# Patient Record
Sex: FEMALE | Race: WHITE | NOT HISPANIC OR LATINO | URBAN - METROPOLITAN AREA
[De-identification: names, ages, dates, MRNs, and addresses within clinical notes are randomized per-mention and may not be internally consistent; named-entity substitution may affect disease eponyms.]

---

## 2024-01-26 ENCOUNTER — OFFICE VISIT (OUTPATIENT)
Dept: URGENT CARE | Facility: CLINIC | Age: 64
End: 2024-01-26
Payer: COMMERCIAL

## 2024-01-26 VITALS
SYSTOLIC BLOOD PRESSURE: 137 MMHG | HEART RATE: 80 BPM | BODY MASS INDEX: 32.22 KG/M2 | DIASTOLIC BLOOD PRESSURE: 73 MMHG | RESPIRATION RATE: 16 BRPM | HEIGHT: 65 IN | TEMPERATURE: 97.1 F | OXYGEN SATURATION: 97 % | WEIGHT: 193.4 LBS

## 2024-01-26 DIAGNOSIS — H10.32 ACUTE BACTERIAL CONJUNCTIVITIS OF LEFT EYE: Primary | ICD-10-CM

## 2024-01-26 PROCEDURE — 99213 OFFICE O/P EST LOW 20 MIN: CPT | Performed by: PHYSICIAN ASSISTANT

## 2024-01-26 RX ORDER — ATORVASTATIN CALCIUM 20 MG/1
20 TABLET, FILM COATED ORAL
COMMUNITY

## 2024-01-26 RX ORDER — TOBRAMYCIN 3 MG/ML
1 SOLUTION/ DROPS OPHTHALMIC
Qty: 5 ML | Refills: 0 | Status: SHIPPED | OUTPATIENT
Start: 2024-01-26

## 2024-01-26 RX ORDER — SITAGLIPTIN 100 MG/1
100 TABLET, FILM COATED ORAL DAILY
COMMUNITY

## 2024-01-26 RX ORDER — LISINOPRIL 10 MG/1
10 TABLET ORAL DAILY
COMMUNITY

## 2024-01-26 NOTE — PROGRESS NOTES
"  [facility]        NAME: Latoya Rico is a 63 y.o. female  : 1960    MRN: [id number]  DATE: 2024  TIME: 8:56 AM    Assessment and Plan   Acute bacterial conjunctivitis of left eye [H10.32]  1. Acute bacterial conjunctivitis of left eye  tobramycin (TOBREX) 0.3 % SOLN            Patient Instructions     Pt has left eye conjunctivitis which I will treat with topical abx drops and reviewed precautions with her regarding pinkeye.   Follow up with PCP in 3-5 days.  Proceed to  ER if symptoms worsen.    Chief Complaint     Chief Complaint   Patient presents with    Eye Problem     Left eye irritation starting last night. Blurred vision, discharge, and pt states it almost looks like she has floaters across vision line.          History of Present Illness       Pt presents with onset yesterday of left eye irritation, redness, discharge, eyelid swelling, some blurriness, and a \"floater\" that she will see intermittently but disappears when she clears the discharge from her eye. Denies FB/trauma, eye pain, or any right eye symptoms.         Review of Systems   Review of Systems   Constitutional: Negative.    Eyes:  Positive for discharge, redness, itching and visual disturbance. Negative for photophobia and pain.        Pertinent positives pertain to left eye   Respiratory: Negative.     Cardiovascular: Negative.    Gastrointestinal: Negative.    Genitourinary: Negative.          Current Medications       Current Outpatient Medications:     atorvastatin (LIPITOR) 20 mg tablet, Take 20 mg by mouth, Disp: , Rfl:     Ergocalciferol (VITAMIN D2 PO), Take 6,000 Units by mouth daily, Disp: , Rfl:     Januvia 100 MG tablet, Take 100 mg by mouth daily, Disp: , Rfl:     lisinopril (ZESTRIL) 10 mg tablet, Take 10 mg by mouth daily, Disp: , Rfl:     metFORMIN (GLUCOPHAGE) 1000 MG tablet, Take 1,000 mg by mouth, Disp: , Rfl:     MILK THISTLE PO, Take 1,000 mg by mouth, Disp: , Rfl:     tobramycin (TOBREX) 0.3 " "% SOLN, Administer 1 drop into the left eye every 4 (four) hours while awake, Disp: 5 mL, Rfl: 0    Current Allergies     Allergies as of 01/26/2024 - Reviewed 01/26/2024   Allergen Reaction Noted    Ciprofloxacin Nausea Only 03/18/2013            The following portions of the patient's history were reviewed and updated as appropriate: allergies, current medications, past family history, past medical history, past social history, past surgical history and problem list.     Past Medical History:   Diagnosis Date    Diabetes mellitus (HCC)     High cholesterol        Past Surgical History:   Procedure Laterality Date    EYE SURGERY      HYSTERECTOMY      KNEE SURGERY         History reviewed. No pertinent family history.      Medications have been verified.        Objective   /73   Pulse 80   Temp (!) 97.1 °F (36.2 °C)   Resp 16   Ht 5' 5\" (1.651 m)   Wt 87.7 kg (193 lb 6.4 oz)   SpO2 97%   BMI 32.18 kg/m²   No LMP recorded. Patient has had a hysterectomy.       Physical Exam     Physical Exam  Vitals reviewed.   Constitutional:       General: She is not in acute distress.     Appearance: She is well-developed.   Eyes:      Comments: Left eye with conjunctival injection, chemosis, purulent discharge, mild lid edema. No photophobia or FB noted. Right eye exam is normal.    Neurological:      Mental Status: She is alert and oriented to person, place, and time.                   "

## 2024-08-09 ENCOUNTER — OFFICE VISIT (OUTPATIENT)
Dept: URGENT CARE | Facility: CLINIC | Age: 64
End: 2024-08-09
Payer: COMMERCIAL

## 2024-08-09 ENCOUNTER — APPOINTMENT (OUTPATIENT)
Dept: RADIOLOGY | Facility: CLINIC | Age: 64
End: 2024-08-09
Payer: COMMERCIAL

## 2024-08-09 VITALS
TEMPERATURE: 97.3 F | SYSTOLIC BLOOD PRESSURE: 154 MMHG | RESPIRATION RATE: 18 BRPM | DIASTOLIC BLOOD PRESSURE: 82 MMHG | HEART RATE: 88 BPM | OXYGEN SATURATION: 99 % | BODY MASS INDEX: 31.65 KG/M2 | HEIGHT: 65 IN | WEIGHT: 190 LBS

## 2024-08-09 DIAGNOSIS — Z23 ENCOUNTER FOR IMMUNIZATION: ICD-10-CM
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
## 2024-08-09 DIAGNOSIS — S93.401A SPRAIN OF RIGHT ANKLE, UNSPECIFIED LIGAMENT, INITIAL ENCOUNTER: ICD-10-CM

## 2024-08-09 DIAGNOSIS — S83.91XA SPRAIN OF RIGHT KNEE, UNSPECIFIED LIGAMENT, INITIAL ENCOUNTER: ICD-10-CM

## 2024-08-09 DIAGNOSIS — S92.503A: Primary | ICD-10-CM

## 2024-08-09 DIAGNOSIS — S90.411A ABRASION OF RIGHT GREAT TOE, INITIAL ENCOUNTER: ICD-10-CM

## 2024-08-09 PROCEDURE — 73610 X-RAY EXAM OF ANKLE: CPT

## 2024-08-09 PROCEDURE — G0382 LEV 3 HOSP TYPE B ED VISIT: HCPCS

## 2024-08-09 PROCEDURE — 90715 TDAP VACCINE 7 YRS/> IM: CPT

## 2024-08-09 PROCEDURE — 90471 IMMUNIZATION ADMIN: CPT

## 2024-08-09 PROCEDURE — 73564 X-RAY EXAM KNEE 4 OR MORE: CPT

## 2024-08-09 PROCEDURE — 73630 X-RAY EXAM OF FOOT: CPT

## 2024-08-09 PROCEDURE — S9083 URGENT CARE CENTER GLOBAL: HCPCS

## 2024-08-09 RX ORDER — CEPHALEXIN 500 MG/1
500 CAPSULE ORAL EVERY 6 HOURS SCHEDULED
Qty: 28 CAPSULE | Refills: 0 | Status: SHIPPED | OUTPATIENT
Start: 2024-08-09 | End: 2024-08-16

## 2024-08-09 NOTE — PROGRESS NOTES
[facility]        NAME: Latoya Rico is a 64 y.o. female  : 1960    MRN: [id number]  DATE: 2024  TIME: 9:07 AM    Assessment and Plan   Closed fracture of phalanx of second toe [S92.503A]  1. Closed fracture of phalanx of second toe  XR foot 3+ vw right    Ambulatory Referral to Orthopedic Surgery      2. Abrasion of right great toe, initial encounter  Tdap Vaccine greater than or equal to 6yo    cephalexin (KEFLEX) 500 mg capsule      3. Encounter for immunization  Tdap Vaccine greater than or equal to 6yo      4. Sprain of right knee, unspecified ligament, initial encounter  Ambulatory Referral to Orthopedic Surgery      5. Sprain of right ankle, unspecified ligament, initial encounter  XR knee 4+ vw right injury    XR ankle 3+ vw right    Ambulatory Referral to Orthopedic Surgery        Preliminary XR read concerning for acute fracture of proximal phalanx of right second toe, final read pending. CAM boot and dressing applied by Shelly Baez RN. DME paperwork completed. Tdap given. Will cover for skin infection given history of Type II DM with Cephalexin. Encouraged continued supportive measures.  RICE therapy. OTC Tylenol/Ibuprofen. Referral placed to Orthopedic Surgery, if needed. Follow up with PCP in 3-5 days or proceed to emergency department for worsening symptoms.  Patient verbalized understanding of instructions given.       Patient Instructions     Patient Instructions   Preliminary XR read concerning for toe fracture, final read pending  CAM boot   Rest, Ice, Compression, and Elevation  OTC Tylenol/Ibuprofen for pain  Take antibiotic as prescribed  Monitor for signs of infection  Referral placed to Orthopedic Surgery, if needed   Follow up with PCP in 3-5 days.  Proceed to  ER if symptoms worsen.    If tests have been performed at [facility], our office will contact you with results if changes need to be made to the care plan discussed with you at the visit.  You can review your  "full results on [facility].    Patient Education     Toe fracture   The Basics   Written by the doctors and editors at [facility]   What is a toe fracture? -- A \"fracture\" is another word for a broken bone. A toe fracture is when a person breaks a bone in the toe (figure 1).  There are different types of fractures, depending on which bone breaks and how it breaks. When a bone breaks, it might crack, break all of the way through, or shatter.  If a broken bone sticks out of the skin or can be seen through a wound, doctors call it an \"open\" fracture. If the bone does not stick out of the skin or cannot be seen through a wound, doctors call it a \"closed\" fracture.  A toe fracture can happen if a person stubs their toe, the toe is bent to the side, or something drops on the toe.  What are the symptoms of a toe fracture? -- Symptoms depend on which bone breaks and the type of break it is. Common symptoms can include:   Pain, swelling, or bruising over the area   The toe looks abnormal, bent, or not the usual shape   Not being able to move the toe   Trouble walking or putting weight on that foot   Numbness in the area of the broken bone  Is there a test for a toe fracture? -- Yes. The doctor or nurse will ask about your symptoms, do an exam, and take an X-ray. They might also do other imaging tests, such as a CT, MRI, or ultrasound. Imaging tests create pictures of the inside of the body.  How is a toe fracture treated? -- Treatment depends, in part, on the type of toe fracture you have and how severe it is. The goal of treatment is to have the ends of the broken bone line up with each other so that the bone can heal.  If the ends of the broken bone are already in line with each other, toe fractures are usually treated with \"richie taping\" (figure 2). Richie taping involves taping the injured toe to the toe next to it. In some cases, the doctor will have you use a rigid shoe or walking boot, or place a short-leg " "walking cast to limit toe movement.  If the ends of your broken bone are not in line with each other, the doctor will need to line them up:   Sometimes, the doctor can move the bone to the correct position without doing surgery, and then put a splint on or richie tape your toe. This is called \"closed fracture reduction.\"   A severe toe fracture, in which a joint is damaged or the bones do not stay in position, is treated with surgery. During surgery, the doctor puts the toe bone back in position. To do this, they can use screws, pins, wires, or plates to fix the bones inside of the toe. This is called \"open fracture reduction.\"  How long does a toe fracture take to heal? -- Most toe fractures take weeks to months to heal. The doctor or nurse will talk to you about when to return to things like work, sports, or other activities.  Healing time also depends on the person. Healthy children usually heal much more quickly than older adults or adults with other medical problems.  How do I care for myself at home? -- To care for yourself or your child at home:   Follow the doctor's instructions for richie taping your toe. Put cotton or other soft material between your toes so they don't rub together (figure 2).   Follow the doctor's instructions for wearing a rigid shoe, walking boot, or walking cast. This supports and protects the bone as it heals. Most people can put weight on their foot and walk around while wearing the rigid shoe, walking boot, or cast.   Do not get a cast wet unless the doctor says that it is waterproof.   Follow instructions for limiting activity and movement until the bone is healed. The doctor or nurse will tell you what activities are safe to do.   Prop the injured foot on pillows, keeping it above the level of the heart. This might help lessen pain and swelling.   The doctor might recommend an over-the-counter pain medicine. These include acetaminophen (sample brand name: Tylenol), ibuprofen (sample " brand names: Advil, Motrin), and naproxen (sample brand name: Aleve).   Some people get a prescription for stronger pain medicines to take for a short time. Follow the instructions for taking these medicines.   Ice can help with pain and swelling:   Put a cold gel pack, bag of ice, or bag of frozen vegetables on the injured area every 1 to 2 hours, for 15 minutes each time. Put a thin towel between the ice (or other cold object) and the skin.   Use the ice (or other cold object) for at least 6 hours after the injury. Some people find it helpful to ice longer, even up to 2 days after their injury.   Eat a healthy diet that includes getting plenty of calcium, vitamin D, and protein (figure 3).   If you smoke, try to quit. Broken bones take longer to heal if you smoke.  When should I call the doctor? -- Call for advice if:   There is less feeling or movement in the toes or foot.   The toe becomes swollen or starts to hurt more.   The skin becomes red or irritated around the cast, or redness starts to spread up the foot.   The cast feels too tight and uncomfortable, or the toes turn pale, blue, or gray.   There is a bad smell or drainage coming from the cast.   The cast feels too loose, you notice a crack in the cast, or the cast becomes soft.   The cast gets wet, and it is not supposed to get wet.  All topics are updated as new evidence becomes available and our peer review process is complete.  This topic retrieved from [employer] on: Feb 26, 2024.  Topic 93713 Version 12.0  Release: 32.2.4 - C32.56  © 2024 UpToDate, Inc. and/or its affiliates. All rights reserved.  figure 1: Foot and ankle bones     This drawing shows the foot, toe, and anklebones.  Graphic 00828 Version 2.0  figure 2: Reji taping of the toes     Reji taping involves taping the injured toe to the toe next to it. Some cotton or other soft material should be put between the toes so they don't rub together.  Graphic 09431 Version 1.0  figure 3: Foods  "and drinks with calcium and vitamin D     Foods rich in calcium include ice cream, soy milk, breads, kale, broccoli, milk, cheese, cottage cheese, almonds, yogurt, ready-to-eat cereals, beans, and tofu. Foods rich in vitamin D include milk, fortified plant-based \"milks\" (soy, almond), canned tuna fish, cod liver oil, yogurt, ready-to-eat-cereals, cooked salmon, canned sardines, mackerel, and eggs. Some of these foods are rich in both.  Graphic 97131 Version 4.0  Consumer Information Use and Disclaimer   Disclaimer: This generalized information is a limited summary of diagnosis, treatment, and/or medication information. It is not meant to be comprehensive and should be used as a tool to help the user understand and/or assess potential diagnostic and treatment options. It does NOT include all information about conditions, treatments, medications, side effects, or risks that may apply to a specific patient. It is not intended to be medical advice or a substitute for the medical advice, diagnosis, or treatment of a health care provider based on the health care provider's examination and assessment of a patient's specific and unique circumstances. Patients must speak with a health care provider for complete information about their health, medical questions, and treatment options, including any risks or benefits regarding use of medications. This information does not endorse any treatments or medications as safe, effective, or approved for treating a specific patient. UpToDate, Inc. and its affiliates disclaim any warranty or liability relating to this information or the use thereof.The use of this information is governed by the Terms of Use, available at https://www.wolterskluwer.com/en/know/clinical-effectiveness-terms. 2024© UpToDate, Inc. and its affiliates and/or licensors. All rights reserved.  Copyright   © 2024 UpToDate, Inc. and/or its affiliates. All rights reserved.  Patient Education     Ankle sprain   The " "Basics   Written by the doctors and editors at [facility]   What happens when a person sprains their ankle? -- When a person sprains their ankle, the ankle joint turns too far in 1 direction.  Inside the ankle are tough bands of tissue called \"ligaments.\" These hold the different bones together. During a sprain, 1 or more of those ligaments stretch too far or even tear (figure 1). This can cause pain and swelling, make the ankle unsteady, and make it hard to put weight on the leg with the injured ankle.  What are the symptoms of an ankle sprain? -- The symptoms can include pain, tenderness, swelling, and bruising at the ankle. Some people with an ankle sprain also find it hard to move the foot in certain directions. Plus, some people cannot put weight on the leg with the injured ankle.  Is there a test for an ankle sprain? -- A doctor or nurse should be able to tell if you have a sprain by doing an exam and learning about what happened to your ankle. They might move your foot in different directions to see what hurts and to check how loose your ankle feels.  In some cases, a doctor might order an X-ray to check for broken bones, but that is not always needed. Some doctors might use an ultrasound to look at the ligaments. Ultrasound is an imaging test that creates pictures of the inside of the body.  Should I see a doctor or nurse? -- See your doctor or nurse if:   You cannot put weight on the leg with the injured ankle.   Your ankle looks deformed or crooked.   Your ankle is unstable (for example, it gives out while you are climbing stairs).  It's also best to see a doctor or nurse if you are not sure how serious an injury is.  How is an ankle sprain treated? -- Treatment for a sprained ankle is easy to remember if you think of the word \"PRICE\":   Protect - To avoid making your injury worse, you can wrap it with an elastic bandage (figure 2). Depending on how bad your sprain is, you might also get a brace or splint.   " "Rest - To rest the ankle, you can use crutches and stay off of your feet. Avoid activities that cause pain.   Ice - Apply a cold gel pack, bag of ice, or bag of frozen vegetables on your ankle every 1 to 2 hours, for 15 minutes each time. Put a thin towel between the ice (or other cold object) and your skin. Use the ice (or other cold object) for at least 6 hours after your injury. Some people find it helpful to ice longer, even up to 2 days after their injury.   Compression - \"Compression\" basically means pressure. You want to have your ankle under slight pressure by having it wrapped in an elastic bandage. This helps reduce swelling and supports the ankle. Your doctor or nurse will show you how to wrap your ankle. Be careful not to wrap it too tight, as this could cut off the blood flow to your foot.   Elevation - \"Elevation\" means keeping your foot raised up above the level of your heart. To do this, you can put your foot on some pillows or blankets while you are lying down, or on a table or chair while you are sitting.  You can also take medicines to relieve pain, such as acetaminophen (sample brand name: Tylenol), ibuprofen (sample brand names: Advil, Motrin), or naproxen (sample brand name: Aleve).  People who have a mild sprain do not usually need to use a splint to keep their foot and ankle still. But people who have a more severe sprain sometimes do.  In rare cases, doctors suggest surgery to repair a torn ligament caused by an ankle sprain.  Can I do anything else to help my recovery? -- Most people heal more quickly if they do certain exercises. Usually, gentle exercises can be started after a few days, once swelling and pain have improved. The right exercises for you depend on what kind of sprain you have and how serious it is. Ask your doctor which exercises you should do. In some cases, they might recommend working with a physical therapist (exercise expert).  Over time, slowly build up the activities " you do with your foot and ankle. It might be easier to do some activities if you wear a brace or splint on your ankle as it heals.  When should I call the doctor? -- Call for advice if:   Your pain or swelling is getting worse.   Your toes are blue or gray, and numb.   Your ankle feels more unstable or wobbly.   You have new or worsening symptoms.  All topics are updated as new evidence becomes available and our peer review process is complete.  This topic retrieved from [employer] on: Feb 26, 2024.  Topic 83050 Version 11.0  Release: 32.2.4 - C32.56  © 2024 UpToDate, Inc. and/or its affiliates. All rights reserved.  figure 1: Ankle sprains     When a person sprains their ankle, the ankle joint turns too far in a particular direction. Inside the ankle are tough bands of tissue called ligaments, which hold the different bones together. During a sprain, 1 or more of those ligaments (shown in white) stretch too far or even tear.  Graphic 07453 Version 2.0  figure 2: How to wrap your ankle with an elastic bandage     To wrap your ankle with an elastic bandage:  Start with the rolled bandage at the top of your foot below your toes. Wrap toward the inside of your ankle.  Loop bandage around your foot and bring it up toward your ankle.  Loop bandage around the back of your ankle and bring it down to the opposite side of your foot.  Loop bandage under your foot again and repeat the process until the roll is done. Secure.  Graphic 823067 Version 2.0  Consumer Information Use and Disclaimer   Disclaimer: This generalized information is a limited summary of diagnosis, treatment, and/or medication information. It is not meant to be comprehensive and should be used as a tool to help the user understand and/or assess potential diagnostic and treatment options. It does NOT include all information about conditions, treatments, medications, side effects, or risks that may apply to a specific patient. It is not intended to be medical  "advice or a substitute for the medical advice, diagnosis, or treatment of a health care provider based on the health care provider's examination and assessment of a patient's specific and unique circumstances. Patients must speak with a health care provider for complete information about their health, medical questions, and treatment options, including any risks or benefits regarding use of medications. This information does not endorse any treatments or medications as safe, effective, or approved for treating a specific patient. UpToDate, Inc. and its affiliates disclaim any warranty or liability relating to this information or the use thereof.The use of this information is governed by the Terms of Use, available at https://www.28msec.MyFit/en/know/clinical-effectiveness-terms. 2024© UpToDate, Inc. and its affiliates and/or licensors. All rights reserved.  Copyright   © 2024 UpToDate, Inc. and/or its affiliates. All rights reserved.  Patient Education     Knee sprain   The Basics   Written by the doctors and editors at [employer]   What causes a knee sprain? -- A knee sprain happens the knee is bent or twisted too far in 1 direction.  Inside the knee are tough bands of tissue called ligaments. These hold the different bones together (figure 1). During a sprain, 1 or more of those ligaments stretch too far or even tear. This can cause pain and swelling and might make the knee feel unsteady.  What are the symptoms of a knee sprain? -- The symptoms can include pain, tenderness, swelling, and bruising of the knee.  Some people with a knee sprain also find it hard to bend the knee or walk. Others might feel like their knee is unstable or \"gives out\" when they go up or down stairs. Also, some people cannot put weight on the leg with the injured knee.  Is there a test for a knee sprain? -- A doctor or nurse might be able to tell if you have a sprain by doing an exam and learning about what happened to your knee. They " "might bend and straighten your leg to see what hurts and check how loose your knee feels.  In some cases, a doctor might order an X-ray to check for broken bones. But this is not always needed. Some doctors might use an ultrasound to look at the ligaments. Ultrasound is an imaging test that creates pictures of the inside of the body. Later, you might need to have other tests like an MRI.  How is a knee sprain treated? -- Ask the doctor or nurse what you should do when you go home. Make sure that you understand exactly what you need to do to care for yourself. Ask questions if there is anything you do not understand.  You should also do the following. Think of the word \"PRICE\":   Protect - To protect your knee, the doctor might order a knee brace or splint for you. An elastic bandage can also keep your knee from moving too much. Wear your knee brace or splint as your doctor tells you to.   Rest - To rest your knee, use crutches and stay off of your feet. You might have to limit your activities and how much you walk or stand. This will help your knee rest while it heals.   Ice - Apply a cold gel pack, bag of ice, or bag of frozen vegetables on your knee every 1 to 2 hours, for 15 minutes each time. Put a thin towel between the ice (or other cold object) and your skin. Use the ice (or other cold object) for at least 6 hours after your injury. Some people find it helpful to ice longer, even up to 2 days after their injury.   Compression - \"Compression\" basically means pressure. You want to have your knee under slight pressure by having it wrapped in an elastic \"compression\" bandage. This helps reduce swelling and supports the knee. Your doctor or nurse will show you how to wrap your knee. Do not wrap it too tight or you might cut off the blood flow to your foot.   Elevation - \"Elevation\" means keeping your knee raised up above the level of your heart. To do this, you can put your leg on some pillows or blankets while " "you are lying down, or on a table or chair while you are sitting.  You can also take medicines to relieve pain, such as acetaminophen (sample brand name: Tylenol), ibuprofen (sample brand names: Advil, Motrin), or naproxen (sample brand name: Aleve).  After a few days, when you have less swelling and pain, you can start to gently stretch your knee. You can also start to do gentle activities again.  Your doctor or nurse might also give you exercises to do as your knee heals. They might also recommend working with a physical therapist (exercise expert).  What follow-up care do I need? -- Your doctor or nurse will tell you if you need to make a follow-up appointment. If so, make sure that you know when and where to go. If the injury is not healing as expected, your doctor might order an X-ray to check for a broken bone.  When should I call the doctor? -- Call for advice if:   Your pain or swelling is getting worse.   Your foot or toes are blue or gray, and numb.   You are unable to put weight on your knee, your knee \"locks\" in place, or your knee \"gives out.\"  All topics are updated as new evidence becomes available and our peer review process is complete.  This topic retrieved from [employer] on: Mar 13, 2024.  Topic 344729 Version 2.0  Release: 32.2.4 - C32.71  © 2024 UpToDate, Inc. and/or its affiliates. All rights reserved.  figure 1: Front view of the knee     This drawing shows the inner parts of the knee as seen from the front. A small bone (called the patella or the \"knee cap\") that sits in front of the knee has been removed so that you can see what is under that bone. The anterior cruciate ligament (ACL) is in the middle in white. It connects the thigh bone (called the \"femur\") to the shin bone (called the \"tibia\"). The meniscus is a cushion of rubbery material (cartilage) between the thigh bone and the shin bone.  Graphic 03885 Version 5.0  Consumer Information Use and Disclaimer   Disclaimer: This generalized " information is a limited summary of diagnosis, treatment, and/or medication information. It is not meant to be comprehensive and should be used as a tool to help the user understand and/or assess potential diagnostic and treatment options. It does NOT include all information about conditions, treatments, medications, side effects, or risks that may apply to a specific patient. It is not intended to be medical advice or a substitute for the medical advice, diagnosis, or treatment of a health care provider based on the health care provider's examination and assessment of a patient's specific and unique circumstances. Patients must speak with a health care provider for complete information about their health, medical questions, and treatment options, including any risks or benefits regarding use of medications. This information does not endorse any treatments or medications as safe, effective, or approved for treating a specific patient. UpToDate, Inc. and its affiliates disclaim any warranty or liability relating to this information or the use thereof.The use of this information is governed by the Terms of Use, available at https://www.Chatterfly.com/en/know/clinical-effectiveness-terms. 2024© UpToDate, Inc. and its affiliates and/or licensors. All rights reserved.  Copyright   © 2024 UpToDate, Inc. and/or its affiliates. All rights reserved.        Chief Complaint     Chief Complaint   Patient presents with    Leg Pain     Right leg and foot pain from fall last night          History of Present Illness       64-year-old female with a past medical history significant for type II DM presents following mechanical fall.  Patient reports slip and fall on back patio yesterday when left leg went out from underneath patient and right leg bent backwards.  Reports unable to ambulate following event and pain increased throughout the night.  Mainly reporting pain in medial and lateral aspects of right knee, right ankle, right  great toe with skin abrasion.  Did perform wound care and apply bandage. No blood thinners or head strike. Tdap unknown.         Review of Systems   Review of Systems   Constitutional:  Negative for chills and fever.   Respiratory:  Negative for cough, shortness of breath and wheezing.    Cardiovascular:  Negative for chest pain.   Gastrointestinal:  Negative for abdominal pain, diarrhea, nausea and vomiting.   Musculoskeletal:  Positive for arthralgias, gait problem and joint swelling.   Skin:  Positive for wound.   Neurological:  Negative for weakness and numbness.         Current Medications       Current Outpatient Medications:     atorvastatin (LIPITOR) 20 mg tablet, Take 20 mg by mouth, Disp: , Rfl:     cephalexin (KEFLEX) 500 mg capsule, Take 1 capsule (500 mg total) by mouth every 6 (six) hours for 7 days, Disp: 28 capsule, Rfl: 0    Ergocalciferol (VITAMIN D2 PO), Take 6,000 Units by mouth daily, Disp: , Rfl:     Januvia 100 MG tablet, Take 100 mg by mouth daily, Disp: , Rfl:     lisinopril (ZESTRIL) 10 mg tablet, Take 10 mg by mouth daily, Disp: , Rfl:     metFORMIN (GLUCOPHAGE) 1000 MG tablet, Take 1,000 mg by mouth, Disp: , Rfl:     MILK THISTLE PO, Take 1,000 mg by mouth, Disp: , Rfl:     tobramycin (TOBREX) 0.3 % SOLN, Administer 1 drop into the left eye every 4 (four) hours while awake (Patient not taking: Reported on 8/9/2024), Disp: 5 mL, Rfl: 0    Current Allergies     Allergies as of 08/09/2024 - Reviewed 08/09/2024   Allergen Reaction Noted    Ciprofloxacin Nausea Only 03/18/2013            The following portions of the patient's history were reviewed and updated as appropriate: allergies, current medications, past family history, past medical history, past social history, past surgical history and problem list.     Past Medical History:   Diagnosis Date    Diabetes mellitus (HCC)     High cholesterol        Past Surgical History:   Procedure Laterality Date    EYE SURGERY      HYSTERECTOMY       "KNEE SURGERY         History reviewed. No pertinent family history.      Medications have been verified.        Objective   /82   Pulse 88   Temp (!) 97.3 °F (36.3 °C) (Temporal)   Resp 18   Ht 5' 5\" (1.651 m)   Wt 86.2 kg (190 lb)   SpO2 99%   BMI 31.62 kg/m²   No LMP recorded. Patient has had a hysterectomy.       Physical Exam     Physical Exam  Vitals and nursing note reviewed.   Constitutional:       General: She is not in acute distress.     Appearance: She is not toxic-appearing.   HENT:      Head: Normocephalic.   Eyes:      Conjunctiva/sclera: Conjunctivae normal.   Pulmonary:      Effort: Pulmonary effort is normal.   Musculoskeletal:         General: Swelling and tenderness present.      Right upper leg: Normal.      Right knee: No swelling or bony tenderness. Normal range of motion. Tenderness present.      Right lower leg: Normal.      Right ankle: Swelling present. Tenderness present over the lateral malleolus. Decreased range of motion.      Right foot: Tenderness present. No swelling.        Legs:    Skin:     General: Skin is warm and dry.   Neurological:      Mental Status: She is alert and oriented to person, place, and time.      Sensory: Sensation is intact.      Motor: Motor function is intact.      Comments: Seated in WC   Psychiatric:         Mood and Affect: Mood normal.         Behavior: Behavior normal.                   "

## 2024-08-09 NOTE — PATIENT INSTRUCTIONS
"Preliminary XR read concerning for toe fracture, final read pending  CAM boot   Rest, Ice, Compression, and Elevation  OTC Tylenol/Ibuprofen for pain  Take antibiotic as prescribed  Monitor for signs of infection  Referral placed to Orthopedic Surgery, if needed   Follow up with PCP in 3-5 days.  Proceed to  ER if symptoms worsen.    If tests have been performed at [facility], our office will contact you with results if changes need to be made to the care plan discussed with you at the visit.  You can review your full results on [facility] MyChart.    Patient Education     Toe fracture   The Basics   Written by the doctors and editors at [facility]   What is a toe fracture? -- A \"fracture\" is another word for a broken bone. A toe fracture is when a person breaks a bone in the toe (figure 1).  There are different types of fractures, depending on which bone breaks and how it breaks. When a bone breaks, it might crack, break all of the way through, or shatter.  If a broken bone sticks out of the skin or can be seen through a wound, doctors call it an \"open\" fracture. If the bone does not stick out of the skin or cannot be seen through a wound, doctors call it a \"closed\" fracture.  A toe fracture can happen if a person stubs their toe, the toe is bent to the side, or something drops on the toe.  What are the symptoms of a toe fracture? -- Symptoms depend on which bone breaks and the type of break it is. Common symptoms can include:   Pain, swelling, or bruising over the area   The toe looks abnormal, bent, or not the usual shape   Not being able to move the toe   Trouble walking or putting weight on that foot   Numbness in the area of the broken bone  Is there a test for a toe fracture? -- Yes. The doctor or nurse will ask about your symptoms, do an exam, and take an X-ray. They might also do other imaging tests, such as a CT, MRI, or ultrasound. Imaging tests create pictures of the inside of the body.  How is a toe " "fracture treated? -- Treatment depends, in part, on the type of toe fracture you have and how severe it is. The goal of treatment is to have the ends of the broken bone line up with each other so that the bone can heal.  If the ends of the broken bone are already in line with each other, toe fractures are usually treated with \"richie taping\" (figure 2). Richie taping involves taping the injured toe to the toe next to it. In some cases, the doctor will have you use a rigid shoe or walking boot, or place a short-leg walking cast to limit toe movement.  If the ends of your broken bone are not in line with each other, the doctor will need to line them up:   Sometimes, the doctor can move the bone to the correct position without doing surgery, and then put a splint on or richie tape your toe. This is called \"closed fracture reduction.\"   A severe toe fracture, in which a joint is damaged or the bones do not stay in position, is treated with surgery. During surgery, the doctor puts the toe bone back in position. To do this, they can use screws, pins, wires, or plates to fix the bones inside of the toe. This is called \"open fracture reduction.\"  How long does a toe fracture take to heal? -- Most toe fractures take weeks to months to heal. The doctor or nurse will talk to you about when to return to things like work, sports, or other activities.  Healing time also depends on the person. Healthy children usually heal much more quickly than older adults or adults with other medical problems.  How do I care for myself at home? -- To care for yourself or your child at home:   Follow the doctor's instructions for richie taping your toe. Put cotton or other soft material between your toes so they don't rub together (figure 2).   Follow the doctor's instructions for wearing a rigid shoe, walking boot, or walking cast. This supports and protects the bone as it heals. Most people can put weight on their foot and walk around while " wearing the rigid shoe, walking boot, or cast.   Do not get a cast wet unless the doctor says that it is waterproof.   Follow instructions for limiting activity and movement until the bone is healed. The doctor or nurse will tell you what activities are safe to do.   Prop the injured foot on pillows, keeping it above the level of the heart. This might help lessen pain and swelling.   The doctor might recommend an over-the-counter pain medicine. These include acetaminophen (sample brand name: Tylenol), ibuprofen (sample brand names: Advil, Motrin), and naproxen (sample brand name: Aleve).   Some people get a prescription for stronger pain medicines to take for a short time. Follow the instructions for taking these medicines.   Ice can help with pain and swelling:   Put a cold gel pack, bag of ice, or bag of frozen vegetables on the injured area every 1 to 2 hours, for 15 minutes each time. Put a thin towel between the ice (or other cold object) and the skin.   Use the ice (or other cold object) for at least 6 hours after the injury. Some people find it helpful to ice longer, even up to 2 days after their injury.   Eat a healthy diet that includes getting plenty of calcium, vitamin D, and protein (figure 3).   If you smoke, try to quit. Broken bones take longer to heal if you smoke.  When should I call the doctor? -- Call for advice if:   There is less feeling or movement in the toes or foot.   The toe becomes swollen or starts to hurt more.   The skin becomes red or irritated around the cast, or redness starts to spread up the foot.   The cast feels too tight and uncomfortable, or the toes turn pale, blue, or gray.   There is a bad smell or drainage coming from the cast.   The cast feels too loose, you notice a crack in the cast, or the cast becomes soft.   The cast gets wet, and it is not supposed to get wet.  All topics are updated as new evidence becomes available and our peer review process is complete.  This  "topic retrieved from [employer] on: Feb 26, 2024.  Topic 15038 Version 12.0  Release: 32.2.4 - C32.56  © 2024 UpToDate, Inc. and/or its affiliates. All rights reserved.  figure 1: Foot and ankle bones     This drawing shows the foot, toe, and anklebones.  Graphic 27016 Version 2.0  figure 2: Reji taping of the toes     Reji taping involves taping the injured toe to the toe next to it. Some cotton or other soft material should be put between the toes so they don't rub together.  Graphic 31125 Version 1.0  figure 3: Foods and drinks with calcium and vitamin D     Foods rich in calcium include ice cream, soy milk, breads, kale, broccoli, milk, cheese, cottage cheese, almonds, yogurt, ready-to-eat cereals, beans, and tofu. Foods rich in vitamin D include milk, fortified plant-based \"milks\" (soy, almond), canned tuna fish, cod liver oil, yogurt, ready-to-eat-cereals, cooked salmon, canned sardines, mackerel, and eggs. Some of these foods are rich in both.  Graphic 56420 Version 4.0  Consumer Information Use and Disclaimer   Disclaimer: This generalized information is a limited summary of diagnosis, treatment, and/or medication information. It is not meant to be comprehensive and should be used as a tool to help the user understand and/or assess potential diagnostic and treatment options. It does NOT include all information about conditions, treatments, medications, side effects, or risks that may apply to a specific patient. It is not intended to be medical advice or a substitute for the medical advice, diagnosis, or treatment of a health care provider based on the health care provider's examination and assessment of a patient's specific and unique circumstances. Patients must speak with a health care provider for complete information about their health, medical questions, and treatment options, including any risks or benefits regarding use of medications. This information does not endorse any treatments or medications " "as safe, effective, or approved for treating a specific patient. UpToDate, Inc. and its affiliates disclaim any warranty or liability relating to this information or the use thereof.The use of this information is governed by the Terms of Use, available at https://www.[employer].com/en/know/clinical-effectiveness-terms. 2024© UpToDate, Inc. and its affiliates and/or licensors. All rights reserved.  Copyright   © 2024 UpToDate, Inc. and/or its affiliates. All rights reserved.  Patient Education     Ankle sprain   The Basics   Written by the doctors and editors at [employer]   What happens when a person sprains their ankle? -- When a person sprains their ankle, the ankle joint turns too far in 1 direction.  Inside the ankle are tough bands of tissue called \"ligaments.\" These hold the different bones together. During a sprain, 1 or more of those ligaments stretch too far or even tear (figure 1). This can cause pain and swelling, make the ankle unsteady, and make it hard to put weight on the leg with the injured ankle.  What are the symptoms of an ankle sprain? -- The symptoms can include pain, tenderness, swelling, and bruising at the ankle. Some people with an ankle sprain also find it hard to move the foot in certain directions. Plus, some people cannot put weight on the leg with the injured ankle.  Is there a test for an ankle sprain? -- A doctor or nurse should be able to tell if you have a sprain by doing an exam and learning about what happened to your ankle. They might move your foot in different directions to see what hurts and to check how loose your ankle feels.  In some cases, a doctor might order an X-ray to check for broken bones, but that is not always needed. Some doctors might use an ultrasound to look at the ligaments. Ultrasound is an imaging test that creates pictures of the inside of the body.  Should I see a doctor or nurse? -- See your doctor or nurse if:   You cannot put weight on the leg with " "the injured ankle.   Your ankle looks deformed or crooked.   Your ankle is unstable (for example, it gives out while you are climbing stairs).  It's also best to see a doctor or nurse if you are not sure how serious an injury is.  How is an ankle sprain treated? -- Treatment for a sprained ankle is easy to remember if you think of the word \"PRICE\":   Protect - To avoid making your injury worse, you can wrap it with an elastic bandage (figure 2). Depending on how bad your sprain is, you might also get a brace or splint.   Rest - To rest the ankle, you can use crutches and stay off of your feet. Avoid activities that cause pain.   Ice - Apply a cold gel pack, bag of ice, or bag of frozen vegetables on your ankle every 1 to 2 hours, for 15 minutes each time. Put a thin towel between the ice (or other cold object) and your skin. Use the ice (or other cold object) for at least 6 hours after your injury. Some people find it helpful to ice longer, even up to 2 days after their injury.   Compression - \"Compression\" basically means pressure. You want to have your ankle under slight pressure by having it wrapped in an elastic bandage. This helps reduce swelling and supports the ankle. Your doctor or nurse will show you how to wrap your ankle. Be careful not to wrap it too tight, as this could cut off the blood flow to your foot.   Elevation - \"Elevation\" means keeping your foot raised up above the level of your heart. To do this, you can put your foot on some pillows or blankets while you are lying down, or on a table or chair while you are sitting.  You can also take medicines to relieve pain, such as acetaminophen (sample brand name: Tylenol), ibuprofen (sample brand names: Advil, Motrin), or naproxen (sample brand name: Aleve).  People who have a mild sprain do not usually need to use a splint to keep their foot and ankle still. But people who have a more severe sprain sometimes do.  In rare cases, doctors suggest surgery " to repair a torn ligament caused by an ankle sprain.  Can I do anything else to help my recovery? -- Most people heal more quickly if they do certain exercises. Usually, gentle exercises can be started after a few days, once swelling and pain have improved. The right exercises for you depend on what kind of sprain you have and how serious it is. Ask your doctor which exercises you should do. In some cases, they might recommend working with a physical therapist (exercise expert).  Over time, slowly build up the activities you do with your foot and ankle. It might be easier to do some activities if you wear a brace or splint on your ankle as it heals.  When should I call the doctor? -- Call for advice if:   Your pain or swelling is getting worse.   Your toes are blue or gray, and numb.   Your ankle feels more unstable or wobbly.   You have new or worsening symptoms.  All topics are updated as new evidence becomes available and our peer review process is complete.  This topic retrieved from [employer] on: Feb 26, 2024.  Topic 47152 Version 11.0  Release: 32.2.4 - C32.56  © 2024 UpToDate, Inc. and/or its affiliates. All rights reserved.  figure 1: Ankle sprains     When a person sprains their ankle, the ankle joint turns too far in a particular direction. Inside the ankle are tough bands of tissue called ligaments, which hold the different bones together. During a sprain, 1 or more of those ligaments (shown in white) stretch too far or even tear.  Graphic 23419 Version 2.0  figure 2: How to wrap your ankle with an elastic bandage     To wrap your ankle with an elastic bandage:  Start with the rolled bandage at the top of your foot below your toes. Wrap toward the inside of your ankle.  Loop bandage around your foot and bring it up toward your ankle.  Loop bandage around the back of your ankle and bring it down to the opposite side of your foot.  Loop bandage under your foot again and repeat the process until the roll is  done. Secure.  Graphic 967724 Version 2.0  Consumer Information Use and Disclaimer   Disclaimer: This generalized information is a limited summary of diagnosis, treatment, and/or medication information. It is not meant to be comprehensive and should be used as a tool to help the user understand and/or assess potential diagnostic and treatment options. It does NOT include all information about conditions, treatments, medications, side effects, or risks that may apply to a specific patient. It is not intended to be medical advice or a substitute for the medical advice, diagnosis, or treatment of a health care provider based on the health care provider's examination and assessment of a patient's specific and unique circumstances. Patients must speak with a health care provider for complete information about their health, medical questions, and treatment options, including any risks or benefits regarding use of medications. This information does not endorse any treatments or medications as safe, effective, or approved for treating a specific patient. UpToDate, Inc. and its affiliates disclaim any warranty or liability relating to this information or the use thereof.The use of this information is governed by the Terms of Use, available at https://www.[employer].com/en/know/clinical-effectiveness-terms. 2024© UpToDate, Inc. and its affiliates and/or licensors. All rights reserved.  Copyright   © 2024 UpToDate, Inc. and/or its affiliates. All rights reserved.  Patient Education     Knee sprain   The Basics   Written by the doctors and editors at [facility]   What causes a knee sprain? -- A knee sprain happens the knee is bent or twisted too far in 1 direction.  Inside the knee are tough bands of tissue called ligaments. These hold the different bones together (figure 1). During a sprain, 1 or more of those ligaments stretch too far or even tear. This can cause pain and swelling and might make the knee feel unsteady.  What  "are the symptoms of a knee sprain? -- The symptoms can include pain, tenderness, swelling, and bruising of the knee.  Some people with a knee sprain also find it hard to bend the knee or walk. Others might feel like their knee is unstable or \"gives out\" when they go up or down stairs. Also, some people cannot put weight on the leg with the injured knee.  Is there a test for a knee sprain? -- A doctor or nurse might be able to tell if you have a sprain by doing an exam and learning about what happened to your knee. They might bend and straighten your leg to see what hurts and check how loose your knee feels.  In some cases, a doctor might order an X-ray to check for broken bones. But this is not always needed. Some doctors might use an ultrasound to look at the ligaments. Ultrasound is an imaging test that creates pictures of the inside of the body. Later, you might need to have other tests like an MRI.  How is a knee sprain treated? -- Ask the doctor or nurse what you should do when you go home. Make sure that you understand exactly what you need to do to care for yourself. Ask questions if there is anything you do not understand.  You should also do the following. Think of the word \"PRICE\":   Protect - To protect your knee, the doctor might order a knee brace or splint for you. An elastic bandage can also keep your knee from moving too much. Wear your knee brace or splint as your doctor tells you to.   Rest - To rest your knee, use crutches and stay off of your feet. You might have to limit your activities and how much you walk or stand. This will help your knee rest while it heals.   Ice - Apply a cold gel pack, bag of ice, or bag of frozen vegetables on your knee every 1 to 2 hours, for 15 minutes each time. Put a thin towel between the ice (or other cold object) and your skin. Use the ice (or other cold object) for at least 6 hours after your injury. Some people find it helpful to ice longer, even up to 2 days " "after their injury.   Compression - \"Compression\" basically means pressure. You want to have your knee under slight pressure by having it wrapped in an elastic \"compression\" bandage. This helps reduce swelling and supports the knee. Your doctor or nurse will show you how to wrap your knee. Do not wrap it too tight or you might cut off the blood flow to your foot.   Elevation - \"Elevation\" means keeping your knee raised up above the level of your heart. To do this, you can put your leg on some pillows or blankets while you are lying down, or on a table or chair while you are sitting.  You can also take medicines to relieve pain, such as acetaminophen (sample brand name: Tylenol), ibuprofen (sample brand names: Advil, Motrin), or naproxen (sample brand name: Aleve).  After a few days, when you have less swelling and pain, you can start to gently stretch your knee. You can also start to do gentle activities again.  Your doctor or nurse might also give you exercises to do as your knee heals. They might also recommend working with a physical therapist (exercise expert).  What follow-up care do I need? -- Your doctor or nurse will tell you if you need to make a follow-up appointment. If so, make sure that you know when and where to go. If the injury is not healing as expected, your doctor might order an X-ray to check for a broken bone.  When should I call the doctor? -- Call for advice if:   Your pain or swelling is getting worse.   Your foot or toes are blue or gray, and numb.   You are unable to put weight on your knee, your knee \"locks\" in place, or your knee \"gives out.\"  All topics are updated as new evidence becomes available and our peer review process is complete.  This topic retrieved from [employer] on: Mar 13, 2024.  Topic 208862 Version 2.0  Release: 32.2.4 - C32.71  © 2024 UpToDate, Inc. and/or its affiliates. All rights reserved.  figure 1: Front view of the knee     This drawing shows the inner parts of the " "knee as seen from the front. A small bone (called the patella or the \"knee cap\") that sits in front of the knee has been removed so that you can see what is under that bone. The anterior cruciate ligament (ACL) is in the middle in white. It connects the thigh bone (called the \"femur\") to the shin bone (called the \"tibia\"). The meniscus is a cushion of rubbery material (cartilage) between the thigh bone and the shin bone.  Graphic 98800 Version 5.0  Consumer Information Use and Disclaimer   Disclaimer: This generalized information is a limited summary of diagnosis, treatment, and/or medication information. It is not meant to be comprehensive and should be used as a tool to help the user understand and/or assess potential diagnostic and treatment options. It does NOT include all information about conditions, treatments, medications, side effects, or risks that may apply to a specific patient. It is not intended to be medical advice or a substitute for the medical advice, diagnosis, or treatment of a health care provider based on the health care provider's examination and assessment of a patient's specific and unique circumstances. Patients must speak with a health care provider for complete information about their health, medical questions, and treatment options, including any risks or benefits regarding use of medications. This information does not endorse any treatments or medications as safe, effective, or approved for treating a specific patient. UpToDate, Inc. and its affiliates disclaim any warranty or liability relating to this information or the use thereof.The use of this information is governed by the Terms of Use, available at https://www.[employer].com/en/know/clinical-effectiveness-terms. 2024© UpToDate, Inc. and its affiliates and/or licensors. All rights reserved.  Copyright   © 2024 UpToDate, Inc. and/or its affiliates. All rights reserved.    "

## 2024-08-14 ENCOUNTER — OFFICE VISIT (OUTPATIENT)
Dept: OBGYN CLINIC | Facility: CLINIC | Age: 64
End: 2024-08-14
Payer: COMMERCIAL

## 2024-08-14 VITALS
HEART RATE: 80 BPM | DIASTOLIC BLOOD PRESSURE: 66 MMHG | HEIGHT: 65 IN | WEIGHT: 189 LBS | SYSTOLIC BLOOD PRESSURE: 118 MMHG | BODY MASS INDEX: 31.49 KG/M2 | OXYGEN SATURATION: 98 % | TEMPERATURE: 97.1 F
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
## 2024-08-14 DIAGNOSIS — Z98.890 HISTORY OF RIGHT KNEE SURGERY: ICD-10-CM

## 2024-08-14 DIAGNOSIS — S92.503A: ICD-10-CM

## 2024-08-14 DIAGNOSIS — M25.561 ACUTE PAIN OF RIGHT KNEE: ICD-10-CM

## 2024-08-14 DIAGNOSIS — S89.91XA INJURY OF RIGHT KNEE, INITIAL ENCOUNTER: ICD-10-CM

## 2024-08-14 DIAGNOSIS — M25.571 ACUTE RIGHT ANKLE PAIN: ICD-10-CM

## 2024-08-14 DIAGNOSIS — S93.401A SPRAIN OF RIGHT ANKLE, UNSPECIFIED LIGAMENT, INITIAL ENCOUNTER: ICD-10-CM

## 2024-08-14 PROCEDURE — 99204 OFFICE O/P NEW MOD 45 MIN: CPT | Performed by: STUDENT IN AN ORGANIZED HEALTH CARE EDUCATION/TRAINING PROGRAM

## 2024-08-14 NOTE — PROGRESS NOTES
1. Fall, initial encounter        2. Injury of right knee, initial encounter  Ambulatory Referral to Orthopedic Surgery      3. Sprain of right ankle, unspecified ligament, initial encounter  Ambulatory Referral to Orthopedic Surgery    Ankle Cude ankle/Ankle Brace      4. Closed fracture of phalanx of second toe  Ambulatory Referral to Orthopedic Surgery    Post Op Shoe    Proximal phalanx MTP      5. Acute pain of right knee        6. History of right knee surgery      Reportedly from patella frx and patella tendon tear years ago. Vertical anterior knee surgical scar noted today      7. Acute right ankle pain          Orders Placed This Encounter   Procedures    Post Op Shoe    Ankle Cude ankle/Ankle Brace        Imaging Studies (I personally reviewed images in PACS and report):    X-ray right foot 8/9/2024: There is a nondisplaced oblique intra-articular fracture of the second proximal phalangeal base.  Otherwise no significant degenerative changes.  X-ray right ankle 8/9/2024: No acute osseous abnormalities.  Mortise is symmetrical.  Soft tissue is unremarkable.  There is a plantar calcaneal spur noted.  X-ray right knee 8/9/2024: No acute osseous abnormalities.  Superior and inferior patella enthesophytes.  Mild patellofemoral arthritic changes.    IMPRESSION:  Multiple aspects of injury addressed s/p mechanical fall, twisting knee/ankle/foot  Acute right second toe pain secondary to nondisplaced proximal oblique intra-articular fracture at the second proximal phalangeal base.  Acute right lateral ankle pain secondary to ankle sprain of the ATFL/CFL  Acute right knee pain secondary to suspected contusion injury versus MCL sprain (differential could include meniscal injury); persistence of pain from OA could be considered as well  Currently, symptoms progressively improving while weightbearing in high tide CAM boot  Date of Injury: 8/8/2024    Other factors:  H/o prior right knee surgery from a patella fracture,  "patella tendon rupture years ago. Chronically reports mildly less ROM/full extension with knee and crepitus (there is a noted vertical surgical scar of right knee on exam)    PLAN:    Clinical exam and radiographic imaging reviewed with patient today, with impression as per above. I have discussed with the patient the pathophysiology of this diagnosis and reviewed how the examination correlates with this diagnosis.    Prior imaging studies were reviewed as noted above.  In regards to toe fracture; counseled conservative treatment with use of buddy taping of the affected toe while weightbearing in a postop shoe. Post op shoe prescribed.  In regards to her right lateral ankle sprain, counseled insert treatment as well.  Counseled he can weight-bear in the high tide Cam boot and progressively transition into use of a lace up ankle brace in a regular footwear.  However patient cannot use regular footwear at this time given her toe fracture thus I recommended if she were to wear an ankle brace to wear with a postop shoe.  Ankle brace prescribed today.  Patient to continue WBAT in CAM boot for now but reports comfort in progressively transitioning to ankle brace and post op shoe  next week (plans to go on camping trip this upcoming weekend) which is reasonable.  Imaging obtained/reviewed as per above. I will follow up official radiology interpretation.  Physical therapy was considered and offered but patient prefers to hold off/defer for now \"will follow-up in 2 weeks to consider depending on her progress which is reasonable.  In regards to pain control I offered prescription naproxen but patient prefers to continue as needed use of acetaminophen, NSAIDs, heat/ice therapy 20 minutes on/off, elevation of the affected extremity which is reasonable.    Return in about 2 weeks (around 8/28/2024).    Portions of the record may have been created with voice recognition software. Occasional wrong word or \"sound a like\" " substitutions may have occurred due to the inherent limitations of voice recognition software. Read the chart carefully and recognize, using context, where substitutions have occurred.     CHIEF COMPLAINT:  Right knee pain, ankle pain, second toe pain secondary to fall    HPI:  Latoya Rico is a 64 y.o. female  who presents for       Visit 8/14/2024 :  Initial evaluation of multiple injuries of right leg s/p fall  History of type 2 diabetes  Precipitating injury on 8/8/2024.  Patient reports slipping and falling backwards on her patio.  She states falling backwards her right knee was in a forced flexed position and also jammed her foot/twisted her ankle she believes.  Went to urgent care the following day and had imaging done as noted above.  Patient was placed in a high tide cam boot.  Patient reports today that her symptoms have been progressively improving since being in urgent care.  She feels that her pain and sense of stability have improved since wearing high tide Cam boot.  In regards to her right knee, she states she still has pain over the general anterior medial aspect but otherwise denies a sense of giving out or locking.  She denies any knee swelling or discoloration.  She does state a sense of stiffness within her right knee.  She describes as a sharp/aching pain of mild to moderate intensity.  She does note a prior history of surgery of her right knee in the past.  She states that she had an injury at 1 point that caused a fracture of her patella as well as a tear of her patellar tendon.  Surgical intervention was performed and since this surgery she states overall she will have intermittent aches and pains of her peripatellar region with activities and slightly limited range of motion.  In regards to her ankle pain, pain is mainly localized over the lateral aspect and describes this as an aching pain of mild intensity since wearing the cam boot.  Reports mild swelling of her ankle but denies bruising.  " She reports improving ankle range of motion movement.  Denies any N/T of her right lower extremity.  In regards to her right second toe injury, she states there is bruising and swelling of this digit.  Denies numbness or tingling.  She reports pain is minimal/tolerable though while weightbearing in high tide Cam boot.  In regards to pain control she has been intermittently taking NSAIDs, acetaminophen and icing with relief.        Medical, Surgical, Family, and Social History    Past Medical History:   Diagnosis Date    Diabetes mellitus (HCC)     High cholesterol      Past Surgical History:   Procedure Laterality Date    EYE SURGERY      HYSTERECTOMY      KNEE SURGERY       Social History   Social History     Substance and Sexual Activity   Alcohol Use None     Social History     Substance and Sexual Activity   Drug Use Not on file     Social History     Tobacco Use   Smoking Status Former    Types: Cigarettes   Smokeless Tobacco Never     History reviewed. No pertinent family history.  Allergies   Allergen Reactions    Ciprofloxacin Nausea Only     \"sick stomach\"          Physical Exam  /66 (BP Location: Left arm, Patient Position: Sitting, Cuff Size: Standard)   Pulse 80   Temp (!) 97.1 °F (36.2 °C)   Ht 5' 5\" (1.651 m)   Wt 85.7 kg (189 lb)   SpO2 98%   BMI 31.45 kg/m²     Constitutional:  see vital signs  Gen: well-developed, normocephalic/atraumatic, well-groomed  Eyes: No inflammation or discharge of conjunctiva or lids; sclera clear   Pulmonary/Chest: Effort normal. No respiratory distress.     Ortho Exam  Right Knee Exam:  Erythema: no  Swelling: no  Increased Warmth: no  Other: Vertical surgical scar over the anterior knee  Tenderness: + General Anteromedial/medial aspect of her knee, mildly over lateral joint line  ROM: 0-130; reports flexion beyond 90 degrees aggravates medial knee pain  Knee flexion strength: 5/5  Knee extension strength: 5/5  Patellar Displacement:  Patellar Compression " with quadriceps contraction:  Patellar Apprehension: negative  Patellar Grind: +  Lachman's: negative  Anterior Drawer: negative  Varus laxity: negative, but aggravates lateral knee pain  Valgus laxity: negative, but aggravates medial knee pain  [facility]: negative        Ankle Examination (focused):     Gait: Normal without antalgia wearing high tide Cam boot on right lower extremity.  Cam boot was removed for examination.      RIGHT   Inspection Erythema none    Edema 1+ lateral ankle    Ecchymosis none        ROM:  Plantarflexion 50    Dorsiflexion 20        Strength Pronation 5/5    Supination 5/5    Foot plantarflexion 5/5    Foot dorsflexion 5/5        TTP AiTFL no    ATFL +    CFL +mild    PTFL no    Achilles no    Deltoid no    Peroneal no    Tib Ant no    Tib Post no        TTP (Bony) Prox Fibula no    Lat Malleolus no    Base of 5th MT no    Med Malleolus no    Navicular no    Talar Dome no        Anterior Drawer ATFL Positive pain w/o laxity   Calcaneal Squeeze  negative   Tib-Fib Squeeze Test  negative   Talar Tilt (stab tib,DF foot,invert foot) CFL Postive pain w/o laxity   ER Stress (stab tib,ER foot) High ankle negative   Eversion stress (stab tib, nohemi foot) deltoid negative   Single foot heel rise PTTD negative   Tinel's   negative   MT Compression  negative         No calf tenderness to palpation     LE NV Exam: +2 DP/PT pulses   Sensation intact to light touch     Right foot exam:  Inspection: Ecchymosis and mild swelling of the second toe.  Otherwise no open wounds, drainage, erythema.  Palpation: Positive tenderness along the proximal phalanx of the second toe otherwise nontender throughout her other lesser toes or great toe.  Sensation intact throughout her second toe.  ROM: Limited but intact motion of her second toe as well as her other lesser toes and great toe          Procedures

## 2024-08-28 ENCOUNTER — OFFICE VISIT (OUTPATIENT)
Dept: OBGYN CLINIC | Facility: CLINIC | Age: 64
End: 2024-08-28
Payer: COMMERCIAL

## 2024-08-28 VITALS
OXYGEN SATURATION: 98 % | HEIGHT: 65 IN | DIASTOLIC BLOOD PRESSURE: 80 MMHG | TEMPERATURE: 97.6 F | SYSTOLIC BLOOD PRESSURE: 140 MMHG | BODY MASS INDEX: 31.49 KG/M2 | HEART RATE: 82 BPM | WEIGHT: 189 LBS

## 2024-08-28 DIAGNOSIS — S80.01XD CONTUSION OF RIGHT KNEE, SUBSEQUENT ENCOUNTER: ICD-10-CM

## 2024-08-28 DIAGNOSIS — S89.91XD INJURY OF RIGHT KNEE, SUBSEQUENT ENCOUNTER: ICD-10-CM

## 2024-08-28 DIAGNOSIS — S93.401D SPRAIN OF RIGHT ANKLE, UNSPECIFIED LIGAMENT, SUBSEQUENT ENCOUNTER: ICD-10-CM

## 2024-08-28 DIAGNOSIS — S92.503A: Primary | ICD-10-CM
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
## 2024-08-28 DIAGNOSIS — Z98.890 HISTORY OF RIGHT KNEE SURGERY: ICD-10-CM

## 2024-08-28 DIAGNOSIS — M25.571 ACUTE RIGHT ANKLE PAIN: ICD-10-CM

## 2024-08-28 PROCEDURE — 99214 OFFICE O/P EST MOD 30 MIN: CPT | Performed by: STUDENT IN AN ORGANIZED HEALTH CARE EDUCATION/TRAINING PROGRAM

## 2024-08-29 NOTE — PROGRESS NOTES
1. Closed fracture of phalanx of second toe        2. Sprain of right ankle, unspecified ligament, subsequent encounter        3. Injury of right knee, subsequent encounter        4. Fall, subsequent encounter        5. History of right knee surgery        6. Acute right ankle pain        7. Contusion of right knee, subsequent encounter          No orders of the defined types were placed in this encounter.       Imaging Studies (I personally reviewed images in PACS and report):    X-ray right foot 8/9/2024: There is a nondisplaced oblique intra-articular fracture of the second proximal phalangeal base.  Otherwise no significant degenerative changes.  X-ray right ankle 8/9/2024: No acute osseous abnormalities.  Mortise is symmetrical.  Soft tissue is unremarkable.  There is a plantar calcaneal spur noted.  X-ray right knee 8/9/2024: No acute osseous abnormalities.  Superior and inferior patella enthesophytes.  Mild patellofemoral arthritic changes.    IMPRESSION:  Multiple aspects of injury addressed s/p mechanical fall, twisting knee/ankle/foot  Acute right second toe pain secondary to nondisplaced proximal oblique intra-articular fracture at the second proximal phalangeal base.  Acute right lateral ankle pain secondary to ankle sprain of the ATFL/CFL  Acute right knee pain secondary to suspected contusion injury versus osteoarthritic pain at this point  Initial symptoms improved wearing high tide Cam boot was able to transition to a lace up ankle brace and postop shoe with richie taping of her second toe on her right lower extremity  Date of Injury: 8/8/2024    Other factors:  H/o prior right knee surgery from a patella fracture, patella tendon rupture years ago. Chronically reports mildly less ROM/full extension with knee and crepitus (there is a noted vertical surgical scar of right knee on exam)    PLAN:    Clinical exam and radiographic imaging reviewed with patient today, with impression as per above. I have  "discussed with the patient the pathophysiology of this diagnosis and reviewed how the examination correlates with this diagnosis.   Reassured patient of her progressive improvement since last visit.  At this point I recommended continued weightbearing on her right lower extremity with use of postop shoe and buddy taping of her second toe.  I counseled only as needed use of her ankle brace during activities that seem to aggravate her lateral ankle at this point.  In regards to her right knee, symptoms have been progressively improving as well.  I counseled that his stiffness is the main limiting factor for her knee that she can use heat therapy 20 minutes on/off to help facilitate improvement of this sensation.  Alternatively, we could consider an intra-articular cortisone injection.  Patient prefers to defer injection at this time as she feels it is not as much pain as it is stiffness.  Prior imaging studies were reviewed as noted above.  In regards to toe fracture; counseled conservative treatment with use of buddy taping of the affected toe while weightbearing in a postop shoe. Post op shoe prescribed.  I offered formal physical therapy to help facilitate recovery as well but patient deferred this option.  In regards to pain control can continue as needed use of acetaminophen, NSAIDs, heat/ice therapy 20 minutes on/off, elevation of the affected extremity which is reasonable.    Return in about 4 weeks (around 9/25/2024), or if symptoms worsen or fail to improve.    Portions of the record may have been created with voice recognition software. Occasional wrong word or \"sound a like\" substitutions may have occurred due to the inherent limitations of voice recognition software. Read the chart carefully and recognize, using context, where substitutions have occurred.     I have spent a total time of 30 minutes in caring for this patient on the day of the visit/encounter including Instructions for management, Patient " and family education, Importance of tx compliance, Risk factor reductions, Impressions, Counseling / Coordination of care, and Documenting in the medical record.      CHIEF COMPLAINT:  Right knee pain, ankle pain, second toe pain secondary to fall    HPI:  Latoya Rico is a 64 y.o. female  who presents with her significant other for     Visit 8/28/2024:  Follow-up evaluation of right knee/right ankle/second right toe:  Improved  Transitioned from CAM boot from last visit to post op shoe and ankle brace of RLE. Continues richie taping second toe  Overall pain improving.     In regards to right knee (h/o patella fracture s/p surgery) - reports pain is more of a tight sensation over superolateral knee that is aggravated with range of motion movements.  Denies any knee swelling, bruising, giving out or locking sensations.  Does not feel she needs a brace as much is trying to figure out ways to reduce her knee range of motion tightness.    In regards to her right lateral ankle, she reports the pain is mild/tolerable while using the brace.  She states there is still some swelling but otherwise feels she is able to tolerate weightbearing even without the brace.  Denies any new inversion injuries of her ankle or foot since last visit.  Denies any N/T of right lower extremity.    In regards to her right second toe fracture, she has remained adherent to use of the richie taping.  She states again the pain is mild/tolerable and on rating from the base of her second toe.  She reports the swelling and bruising have receded.  Denies any new injury since last visit.      Visit 8/14/2024 :  Initial evaluation of multiple injuries of right leg s/p fall  History of type 2 diabetes  Precipitating injury on 8/8/2024.  Patient reports slipping and falling backwards on her patio.  She states falling backwards her right knee was in a forced flexed position and also jammed her foot/twisted her ankle she believes.  Went to urgent care the  following day and had imaging done as noted above.  Patient was placed in a high tide cam boot.  Patient reports today that her symptoms have been progressively improving since being in urgent care.  She feels that her pain and sense of stability have improved since wearing high tide Cam boot.  In regards to her right knee, she states she still has pain over the general anterior medial aspect but otherwise denies a sense of giving out or locking.  She denies any knee swelling or discoloration.  She does state a sense of stiffness within her right knee.  She describes as a sharp/aching pain of mild to moderate intensity.  She does note a prior history of surgery of her right knee in the past.  She states that she had an injury at 1 point that caused a fracture of her patella as well as a tear of her patellar tendon.  Surgical intervention was performed and since this surgery she states overall she will have intermittent aches and pains of her peripatellar region with activities and slightly limited range of motion.  In regards to her ankle pain, pain is mainly localized over the lateral aspect and describes this as an aching pain of mild intensity since wearing the cam boot.  Reports mild swelling of her ankle but denies bruising.  She reports improving ankle range of motion movement.  Denies any N/T of her right lower extremity.  In regards to her right second toe injury, she states there is bruising and swelling of this digit.  Denies numbness or tingling.  She reports pain is minimal/tolerable though while weightbearing in high tide Cam boot.  In regards to pain control she has been intermittently taking NSAIDs, acetaminophen and icing with relief.        Medical, Surgical, Family, and Social History    Past Medical History:   Diagnosis Date    Diabetes mellitus (HCC)     High cholesterol      Past Surgical History:   Procedure Laterality Date    BACK SURGERY  2019    EYE SURGERY      HYSTERECTOMY      KNEE  "SURGERY       Social History   Social History     Substance and Sexual Activity   Alcohol Use Not Currently     Social History     Substance and Sexual Activity   Drug Use Never     Social History     Tobacco Use   Smoking Status Former    Current packs/day: 1.00    Average packs/day: 1 pack/day for 5.0 years (5.0 ttl pk-yrs)    Types: Cigarettes   Smokeless Tobacco Never     Family History   Problem Relation Age of Onset    Diabetes Brother      Allergies   Allergen Reactions    Ciprofloxacin Nausea Only     \"sick stomach\"          Physical Exam  /80   Pulse 82   Temp 97.6 °F (36.4 °C) (Temporal)   Ht 5' 5\" (1.651 m)   Wt 85.7 kg (189 lb)   SpO2 98%   BMI 31.45 kg/m²     Constitutional:  see vital signs  Gen: well-developed, normocephalic/atraumatic, well-groomed  Eyes: No inflammation or discharge of conjunctiva or lids; sclera clear   Pulmonary/Chest: Effort normal. No respiratory distress.     Ortho Exam  Right Knee Exam:  Erythema: no  Swelling: no  Increased Warmth: no  Other: Vertical surgical scar over the anterior knee  Tenderness: none  ROM: 0-130; reports sense of tightness of knee with flexion over superolateral knee  Knee flexion strength: 5/5  Knee extension strength: 5/5  Patellar Displacement:  Patellar Compression with quadriceps contraction:  Patellar Apprehension: negative  Patellar Grind: +  Lachman's: negative  Anterior Drawer: negative  Varus laxity: negative  Valgus laxity: negative  Joe: negative        Ankle Examination (focused):     Gait: Normal antalgia wearing a lace up ankle brace on the right foot along with a postop shoe and richie taping of her second toe    RIGHT   Inspection Erythema none    Edema Scant over lateral ankle    Ecchymosis none        ROM:  Plantarflexion 50    Dorsiflexion 20        Strength Pronation 5/5    Supination 5/5    Foot plantarflexion 5/5    Foot dorsflexion 5/5        TTP AiTFL no    ATFL +mild    CFL +mild    PTFL no    Achilles no    " Deltoid no    Peroneal no    Tib Ant no    Tib Post no        TTP (Bony) Prox Fibula no    Lat Malleolus no    Base of 5th MT no    Med Malleolus no    Navicular no    Talar Dome no        Anterior Drawer ATFL negative   Calcaneal Squeeze  negative   Tib-Fib Squeeze Test  negative   Talar Tilt (stab tib,DF foot,invert foot) CFL negative   ER Stress (stab tib,ER foot) High ankle negative   Eversion stress (stab tib, nohemi foot) deltoid negative   Single foot heel rise PTTD negative   Tinel's   negative   MT Compression  negative         No calf tenderness to palpation     LE NV Exam: +2 DP/PT pulses   Sensation intact to light touch     Right foot exam:  Inspection: No edema, erythema, open wounds or drainage  Palpation: + tenderness along the proximal phalanx of the second toe otherwise nontender throughout her other lesser toes or great toe.  Sensation intact throughout her second toe.  ROM: Intact ROM of her second toe, lesser toes, great toe          Procedures

## 2024-09-25 ENCOUNTER — OFFICE VISIT (OUTPATIENT)
Dept: OBGYN CLINIC | Facility: CLINIC | Age: 64
End: 2024-09-25
Payer: COMMERCIAL

## 2024-09-25 VITALS
BODY MASS INDEX: 31.36 KG/M2 | HEART RATE: 82 BPM | TEMPERATURE: 97.2 F | WEIGHT: 188.2 LBS | OXYGEN SATURATION: 97 % | HEIGHT: 65 IN | DIASTOLIC BLOOD PRESSURE: 62 MMHG | SYSTOLIC BLOOD PRESSURE: 104 MMHG

## 2024-09-25 DIAGNOSIS — S89.91XD INJURY OF RIGHT KNEE, SUBSEQUENT ENCOUNTER: ICD-10-CM

## 2024-09-25 DIAGNOSIS — S92.503A: Primary | ICD-10-CM

## 2024-09-25 DIAGNOSIS — S80.01XD CONTUSION OF RIGHT KNEE, SUBSEQUENT ENCOUNTER: ICD-10-CM

## 2024-09-25 DIAGNOSIS — S93.401D SPRAIN OF RIGHT ANKLE, UNSPECIFIED LIGAMENT, SUBSEQUENT ENCOUNTER: ICD-10-CM

## 2024-09-25 PROCEDURE — 99213 OFFICE O/P EST LOW 20 MIN: CPT | Performed by: STUDENT IN AN ORGANIZED HEALTH CARE EDUCATION/TRAINING PROGRAM

## 2024-09-25 NOTE — PROGRESS NOTES
1. Closed fracture of phalanx of second toe        2. Sprain of right ankle, unspecified ligament, subsequent encounter        3. Injury of right knee, subsequent encounter        4. Contusion of right knee, subsequent encounter            No orders of the defined types were placed in this encounter.       Imaging Studies (I personally reviewed images in PACS and report):    X-ray right foot 8/9/2024: There is a nondisplaced oblique intra-articular fracture of the second proximal phalangeal base.  Otherwise no significant degenerative changes.  X-ray right ankle 8/9/2024: No acute osseous abnormalities.  Mortise is symmetrical.  Soft tissue is unremarkable.  There is a plantar calcaneal spur noted.  X-ray right knee 8/9/2024: No acute osseous abnormalities.  Superior and inferior patella enthesophytes.  Mild patellofemoral arthritic changes.    IMPRESSION:  Multiple aspects of injury addressed s/p mechanical fall, twisting knee/ankle/foot  Acute right second toe pain secondary to nondisplaced proximal oblique intra-articular fracture at the second proximal phalangeal base. - clinically improved since last visit  Acute right lateral ankle pain secondary to ankle sprain of the ATFL/CFL - clinically improved since last visit  Acute right knee pain secondary to suspected contusion injury versus osteoarthritic pain at this point  Initial symptoms improved wearing high tide Cam boot was able to transition to a lace up ankle brace and postop shoe with richie taping of her second toe on her right lower extremity  Date of Injury: 8/8/2024    Other factors:  H/o prior right knee surgery from a patella fracture, patella tendon rupture years ago. Chronically reports mildly less ROM/full extension with knee and crepitus (there is a noted vertical surgical scar of right knee on exam)    PLAN:    Clinical exam and radiographic imaging reviewed with patient today, with impression as per above. I have discussed with the patient the  "pathophysiology of this diagnosis and reviewed how the examination correlates with this diagnosis.   Reassured patient of her progressive improvement since last visit.  Counseled she can return to wearing regular footwear on her right lower extremity and out of the postop shoe.  Counseled that she may have increase sensitivity along the base of her toe that could last several weeks to months and if counseled that she may experience some increased sensitivity along the base of her toe at the site of fracture for several weeks to months and that she can always use buddy taping as needed.    Return if symptoms worsen or fail to improve.    Portions of the record may have been created with voice recognition software. Occasional wrong word or \"sound a like\" substitutions may have occurred due to the inherent limitations of voice recognition software. Read the chart carefully and recognize, using context, where substitutions have occurred.         CHIEF COMPLAINT:  Right knee pain, ankle pain, second toe pain secondary to fall    HPI:  Latoya Rico is a 64 y.o. female  who presents with her significant other for     Visit 9/25/2024:  Follow-up evaluation of right second toe fracture:  Improved since last visit.  Has been maintaining buddy taping and postop shoe.  She states that most she has some increased sensitivity at the base of her toe but otherwise she feels she has full range of motion of her toe again.  Denies any foot or toe swelling, discoloration, N/T.    Follow-up right knee injury  Improving.  Reports intermittent sense of tightness along her anterior knee but otherwise she has full knee range of motion, strength.  Pain is coming from prolonged walking but otherwise she has been doing well.  Denies any swelling, discoloration.    Follow-up right lateral ankle sprain:  Improved.  Patient reports full range of motion of her ankle and strength.  She states no longer use brace.  Swelling, " discoloration.        Visit 8/28/2024:  Follow-up evaluation of right knee/right ankle/second right toe:  Improved  Transitioned from CAM boot from last visit to post op shoe and ankle brace of RLE. Continues richie taping second toe  Overall pain improving.     In regards to right knee (h/o patella fracture s/p surgery) - reports pain is more of a tight sensation over superolateral knee that is aggravated with range of motion movements.  Denies any knee swelling, bruising, giving out or locking sensations.  Does not feel she needs a brace as much is trying to figure out ways to reduce her knee range of motion tightness.    In regards to her right lateral ankle, she reports the pain is mild/tolerable while using the brace.  She states there is still some swelling but otherwise feels she is able to tolerate weightbearing even without the brace.  Denies any new inversion injuries of her ankle or foot since last visit.  Denies any N/T of right lower extremity.    In regards to her right second toe fracture, she has remained adherent to use of the richie taping.  She states again the pain is mild/tolerable and on rating from the base of her second toe.  She reports the swelling and bruising have receded.  Denies any new injury since last visit.      Visit 8/14/2024 :  Initial evaluation of multiple injuries of right leg s/p fall  History of type 2 diabetes  Precipitating injury on 8/8/2024.  Patient reports slipping and falling backwards on her patio.  She states falling backwards her right knee was in a forced flexed position and also jammed her foot/twisted her ankle she believes.  Went to urgent care the following day and had imaging done as noted above.  Patient was placed in a high tide cam boot.  Patient reports today that her symptoms have been progressively improving since being in urgent care.  She feels that her pain and sense of stability have improved since wearing high tide Cam boot.  In regards to her right  knee, she states she still has pain over the general anterior medial aspect but otherwise denies a sense of giving out or locking.  She denies any knee swelling or discoloration.  She does state a sense of stiffness within her right knee.  She describes as a sharp/aching pain of mild to moderate intensity.  She does note a prior history of surgery of her right knee in the past.  She states that she had an injury at 1 point that caused a fracture of her patella as well as a tear of her patellar tendon.  Surgical intervention was performed and since this surgery she states overall she will have intermittent aches and pains of her peripatellar region with activities and slightly limited range of motion.  In regards to her ankle pain, pain is mainly localized over the lateral aspect and describes this as an aching pain of mild intensity since wearing the cam boot.  Reports mild swelling of her ankle but denies bruising.  She reports improving ankle range of motion movement.  Denies any N/T of her right lower extremity.  In regards to her right second toe injury, she states there is bruising and swelling of this digit.  Denies numbness or tingling.  She reports pain is minimal/tolerable though while weightbearing in high tide Cam boot.  In regards to pain control she has been intermittently taking NSAIDs, acetaminophen and icing with relief.        Medical, Surgical, Family, and Social History    Past Medical History:   Diagnosis Date    Diabetes mellitus (HCC)     High cholesterol      Past Surgical History:   Procedure Laterality Date    BACK SURGERY  2019    EYE SURGERY      HYSTERECTOMY      KNEE SURGERY       Social History   Social History     Substance and Sexual Activity   Alcohol Use Not Currently     Social History     Substance and Sexual Activity   Drug Use Never     Social History     Tobacco Use   Smoking Status Former    Current packs/day: 1.00    Average packs/day: 1 pack/day for 5.0 years (5.0 ttl  "pk-yrs)    Types: Cigarettes   Smokeless Tobacco Never     Family History   Problem Relation Age of Onset    Diabetes Brother      Allergies   Allergen Reactions    Ciprofloxacin Nausea Only     \"sick stomach\"          Physical Exam  /62 (BP Location: Left arm, Patient Position: Sitting, Cuff Size: Standard)   Pulse 82   Temp (!) 97.2 °F (36.2 °C) (Tympanic)   Ht 5' 5\" (1.651 m)   Wt 85.4 kg (188 lb 3.2 oz)   SpO2 97%   BMI 31.32 kg/m²     Constitutional:  see vital signs  Gen: well-developed, normocephalic/atraumatic, well-groomed  Eyes: No inflammation or discharge of conjunctiva or lids; sclera clear   Pulmonary/Chest: Effort normal. No respiratory distress.     Ortho Exam  Right Knee Exam:  Erythema: no  Swelling: no  Increased Warmth: no  Other: Vertical surgical scar over the anterior knee  Tenderness: none  ROM: 0-130  Knee flexion strength: 5/5  Knee extension strength: 5/5  Patellar Grind: +  Varus laxity: negative  Valgus laxity: negative         Ankle Examination (focused):     Gait: Normal without antalgia wearing postop shoe on right lower extremity    RIGHT   Inspection Erythema none    Edema None    Ecchymosis none        ROM:  Plantarflexion 50    Dorsiflexion 20        Strength Pronation 5/5    Supination 5/5    Foot plantarflexion 5/5    Foot dorsflexion 5/5        TTP AiTFL no    ATFL No    CFL no    PTFL no    Achilles no    Deltoid no    Peroneal no    Tib Ant no    Tib Post no        TTP (Bony) Prox Fibula no    Lat Malleolus no    Base of 5th MT no    Med Malleolus no    Navicular no    Talar Dome no        Anterior Drawer ATFL negative   Calcaneal Squeeze  negative   Tib-Fib Squeeze Test  negative   Talar Tilt (stab tib,DF foot,invert foot) CFL negative   ER Stress (stab tib,ER foot) High ankle negative   Eversion stress (stab tib, nohemi foot) deltoid negative   MT Compression  negative         No calf tenderness to palpation     LE NV Exam: +2 DP/PT pulses   Sensation intact to " light touch     Right foot exam:  Inspection: No edema, erythema, open wounds or drainage  Palpation: + tenderness along the proximal phalanx of the second toe otherwise nontender throughout her other lesser toes or great toe.  Sensation intact throughout her second toe.  ROM: Intact ROM of her second toe, lesser toes, great toe          Procedures